# Patient Record
Sex: FEMALE | URBAN - METROPOLITAN AREA
[De-identification: names, ages, dates, MRNs, and addresses within clinical notes are randomized per-mention and may not be internally consistent; named-entity substitution may affect disease eponyms.]

---

## 2021-04-19 ENCOUNTER — APPOINTMENT (RX ONLY)
Dept: URBAN - METROPOLITAN AREA CLINIC 95 | Facility: CLINIC | Age: 41
Setting detail: DERMATOLOGY
End: 2021-04-19

## 2021-04-19 DIAGNOSIS — Z41.9 ENCOUNTER FOR PROCEDURE FOR PURPOSES OTHER THAN REMEDYING HEALTH STATE, UNSPECIFIED: ICD-10-CM

## 2021-04-19 PROCEDURE — ? TREATMENT REGIMEN

## 2021-04-21 ENCOUNTER — APPOINTMENT (RX ONLY)
Dept: URBAN - METROPOLITAN AREA CLINIC 95 | Facility: CLINIC | Age: 41
Setting detail: DERMATOLOGY
End: 2021-04-21

## 2021-04-21 DIAGNOSIS — Z41.9 ENCOUNTER FOR PROCEDURE FOR PURPOSES OTHER THAN REMEDYING HEALTH STATE, UNSPECIFIED: ICD-10-CM

## 2021-04-21 PROCEDURE — ? COSMETIC CONSULTATION - KYBELLA

## 2021-04-21 PROCEDURE — ? ADDITIONAL NOTES

## 2021-04-21 PROCEDURE — ? COSMETIC CONSULTATION: PROFOUND

## 2021-04-21 NOTE — PROCEDURE: COSMETIC CONSULTATION: PROFOUND
Indication (Optional): loss of elasticity
Detail Level: Detailed
Type Of Cartridge (Optional): sub dermal
Price (Use Numbers Only, No Special Characters Or $): 1500.00

## 2021-04-21 NOTE — PROCEDURE: ADDITIONAL NOTES
Render Risk Assessment In Note?: no
Additional Notes: Discussed Kybella vs Profound. Information given on both. Also discussed filler for her cheeks to help take away the fullness look in her  presulkus jowl.
Detail Level: Detailed

## 2021-04-21 NOTE — PROCEDURE: COSMETIC CONSULTATION - KYBELLA
Vials Recommended (Won't Render If 0): 6
Price (Use Numbers Only, No Special Characters Or $): 6341
Detail Level: Detailed
# Of Treatments Recommended (Won't Render If 0): 3

## 2021-05-12 ENCOUNTER — APPOINTMENT (RX ONLY)
Dept: URBAN - METROPOLITAN AREA CLINIC 95 | Facility: CLINIC | Age: 41
Setting detail: DERMATOLOGY
End: 2021-05-12

## 2021-05-12 DIAGNOSIS — Z41.9 ENCOUNTER FOR PROCEDURE FOR PURPOSES OTHER THAN REMEDYING HEALTH STATE, UNSPECIFIED: ICD-10-CM

## 2021-05-12 PROCEDURE — ? PROFOUND

## 2021-05-12 PROCEDURE — ? ADDITIONAL NOTES

## 2021-05-12 ASSESSMENT — LOCATION DETAILED DESCRIPTION DERM
LOCATION DETAILED: RIGHT CENTRAL MANDIBULAR CHEEK
LOCATION DETAILED: LEFT INFERIOR CENTRAL MALAR CHEEK
LOCATION DETAILED: RIGHT CHIN
LOCATION DETAILED: LEFT SUPERIOR LATERAL NECK
LOCATION DETAILED: RIGHT INFERIOR CENTRAL MALAR CHEEK
LOCATION DETAILED: LEFT SUBMANDIBULAR AREA
LOCATION DETAILED: RIGHT INFERIOR LATERAL MALAR CHEEK
LOCATION DETAILED: RIGHT CENTRAL BUCCAL CHEEK
LOCATION DETAILED: RIGHT CENTRAL ANTERIOR NECK
LOCATION DETAILED: LEFT MEDIAL SUBMANDIBULAR CHEEK
LOCATION DETAILED: LEFT CENTRAL ANTERIOR NECK
LOCATION DETAILED: LEFT CHIN
LOCATION DETAILED: LEFT LATERAL BUCCAL CHEEK
LOCATION DETAILED: RIGHT CENTRAL LATERAL NECK
LOCATION DETAILED: LEFT CENTRAL SUBMANDIBULAR CHEEK
LOCATION DETAILED: SUBMENTAL CHIN
LOCATION DETAILED: RIGHT MEDIAL SUBMANDIBULAR CHEEK

## 2021-05-12 ASSESSMENT — LOCATION SIMPLE DESCRIPTION DERM
LOCATION SIMPLE: NECK
LOCATION SIMPLE: SUBMENTAL CHIN
LOCATION SIMPLE: RIGHT CHEEK
LOCATION SIMPLE: LEFT CHEEK
LOCATION SIMPLE: LEFT SUBMANDIBULAR AREA
LOCATION SIMPLE: CHIN

## 2021-05-12 ASSESSMENT — LOCATION ZONE DERM
LOCATION ZONE: FACE
LOCATION ZONE: NECK

## 2021-05-12 NOTE — PROCEDURE: PROFOUND
Post-Care Instructions: I reviewed with the patient in detail post-care instructions. Patient should stay away from the sun and wear sun protection until fully healed.
Location: abdomen
Local Anesthesia: 1% lidocaine with epinephrine and 1% lidocaine without epinephrine in a 1:2 solution
Device Serial Number (Optional): [id number]
Time Duration (Optional): 3.8 ms
Temperature (Optional): 68
Total Insertions (Required): 100
Temperature (Optional): 68
Temperature (Optional): 67
Post-Procedure Text: Face cleansed with Sterile water and CeraVe healing ointment was applied. Patient was given ice pack and advised to use them several time today to help with edema and bruising.
Treatment Number: 1
Detail Level: Simple
Spacinmm
Temperature (Optional): 67
Pre-Treatment Photos?: Yes
Length Topical Anesthesia Applied (Optional): 35 minutes
Time Duration (Optional): 4 sec
Time Duration (Optional): 3 ms
Consent: Written consent obtained, risks reviewed including but not limited to crusting, scabbing, blistering, scarring, darker or lighter pigmentary change, and/or incomplete improvement.
Time Duration (Optional): 4 ms
Anesthesia Volume In Cc: 30
External Cooling Fan Speed: 0
Topical Anesthesia?: BLT ointment (benzocaine 20%, lidocaine 10%, tetracaine 4%)
Price (Use Numbers Only, No Special Characters Or $): 2942
Pre-Procedure Text: After consent was obtained the treatment areas were cleaned and treated using the parameters mentioned above.

## 2021-05-12 NOTE — PROCEDURE: ADDITIONAL NOTES
Detail Level: Detailed
Render Risk Assessment In Note?: no
Additional Notes: Treated patient sub mental and neck with the Sub Q cartridge first at 68°C forMS 242 insertions then traded the same area with the dermal cartridge at 67°C 3 ms 204 insertions. Mid face and chin treated with dermal cartridge 67°C 3 ms 214 insertions. Post instructions given verbally and in writing and aquaphor applied post treatment. Cetaphil cleanser samples and Aquaphor samples given to patient. Ice packs also given. She will fu in 3 months.
Additional Notes: Patient had post treatment Purpera and mild edema.

## 2021-08-13 ENCOUNTER — APPOINTMENT (RX ONLY)
Dept: URBAN - METROPOLITAN AREA CLINIC 95 | Facility: CLINIC | Age: 41
Setting detail: DERMATOLOGY
End: 2021-08-13

## 2021-08-13 DIAGNOSIS — Z41.9 ENCOUNTER FOR PROCEDURE FOR PURPOSES OTHER THAN REMEDYING HEALTH STATE, UNSPECIFIED: ICD-10-CM

## 2021-08-13 PROCEDURE — ? ADDITIONAL NOTES

## 2021-08-13 NOTE — PROCEDURE: ADDITIONAL NOTES
Detail Level: Detailed
Additional Notes: Patient here for fu. Slight improvement noted in photos. Patient will return in 3 months. Advised her to watch her weight gain, it can effect her results.
Render Risk Assessment In Note?: no